# Patient Record
Sex: FEMALE | Race: BLACK OR AFRICAN AMERICAN | NOT HISPANIC OR LATINO | URBAN - METROPOLITAN AREA
[De-identification: names, ages, dates, MRNs, and addresses within clinical notes are randomized per-mention and may not be internally consistent; named-entity substitution may affect disease eponyms.]

---

## 2018-07-09 PROBLEM — Z00.00 ENCOUNTER FOR PREVENTIVE HEALTH EXAMINATION: Status: ACTIVE | Noted: 2018-07-09

## 2021-07-13 ENCOUNTER — INPATIENT (INPATIENT)
Facility: HOSPITAL | Age: 31
LOS: 1 days | Discharge: HOME | End: 2021-07-15
Attending: OBSTETRICS & GYNECOLOGY | Admitting: OBSTETRICS & GYNECOLOGY
Payer: MEDICAID

## 2021-07-13 VITALS — HEART RATE: 76 BPM | TEMPERATURE: 99 F | SYSTOLIC BLOOD PRESSURE: 105 MMHG | DIASTOLIC BLOOD PRESSURE: 63 MMHG

## 2021-07-13 DIAGNOSIS — Z3A.00 WEEKS OF GESTATION OF PREGNANCY NOT SPECIFIED: ICD-10-CM

## 2021-07-13 DIAGNOSIS — Z98.890 OTHER SPECIFIED POSTPROCEDURAL STATES: Chronic | ICD-10-CM

## 2021-07-13 DIAGNOSIS — Z34.80 ENCOUNTER FOR SUPERVISION OF OTHER NORMAL PREGNANCY, UNSPECIFIED TRIMESTER: ICD-10-CM

## 2021-07-13 DIAGNOSIS — O26.899 OTHER SPECIFIED PREGNANCY RELATED CONDITIONS, UNSPECIFIED TRIMESTER: ICD-10-CM

## 2021-07-13 LAB
BASOPHILS # BLD AUTO: 0.03 K/UL — SIGNIFICANT CHANGE UP (ref 0–0.2)
BASOPHILS NFR BLD AUTO: 0.3 % — SIGNIFICANT CHANGE UP (ref 0–2)
BLD GP AB SCN SERPL QL: NEGATIVE — SIGNIFICANT CHANGE UP
EOSINOPHIL # BLD AUTO: 0.04 K/UL — SIGNIFICANT CHANGE UP (ref 0–0.5)
EOSINOPHIL NFR BLD AUTO: 0.4 % — SIGNIFICANT CHANGE UP (ref 0–6)
HCT VFR BLD CALC: 35.7 % — SIGNIFICANT CHANGE UP (ref 34.5–45)
HGB BLD-MCNC: 11.4 G/DL — LOW (ref 11.5–15.5)
IMM GRANULOCYTES NFR BLD AUTO: 0.5 % — SIGNIFICANT CHANGE UP (ref 0–1.5)
LYMPHOCYTES # BLD AUTO: 2.01 K/UL — SIGNIFICANT CHANGE UP (ref 1–3.3)
LYMPHOCYTES # BLD AUTO: 22.1 % — SIGNIFICANT CHANGE UP (ref 13–44)
MCHC RBC-ENTMCNC: 28.6 PG — SIGNIFICANT CHANGE UP (ref 27–34)
MCHC RBC-ENTMCNC: 31.9 GM/DL — LOW (ref 32–36)
MCV RBC AUTO: 89.7 FL — SIGNIFICANT CHANGE UP (ref 80–100)
MONOCYTES # BLD AUTO: 0.57 K/UL — SIGNIFICANT CHANGE UP (ref 0–0.9)
MONOCYTES NFR BLD AUTO: 6.3 % — SIGNIFICANT CHANGE UP (ref 2–14)
NEUTROPHILS # BLD AUTO: 6.4 K/UL — SIGNIFICANT CHANGE UP (ref 1.8–7.4)
NEUTROPHILS NFR BLD AUTO: 70.4 % — SIGNIFICANT CHANGE UP (ref 43–77)
NRBC # BLD: 0 /100 WBCS — SIGNIFICANT CHANGE UP (ref 0–0)
PLATELET # BLD AUTO: 213 K/UL — SIGNIFICANT CHANGE UP (ref 150–400)
RBC # BLD: 3.98 M/UL — SIGNIFICANT CHANGE UP (ref 3.8–5.2)
RBC # FLD: 14.8 % — HIGH (ref 10.3–14.5)
RH IG SCN BLD-IMP: POSITIVE — SIGNIFICANT CHANGE UP
RH IG SCN BLD-IMP: POSITIVE — SIGNIFICANT CHANGE UP
SARS-COV-2 RNA SPEC QL NAA+PROBE: SIGNIFICANT CHANGE UP
WBC # BLD: 9.1 K/UL — SIGNIFICANT CHANGE UP (ref 3.8–10.5)
WBC # FLD AUTO: 9.1 K/UL — SIGNIFICANT CHANGE UP (ref 3.8–10.5)

## 2021-07-13 PROCEDURE — 59409 OBSTETRICAL CARE: CPT | Mod: U9

## 2021-07-13 RX ORDER — BENZOCAINE 10 %
1 GEL (GRAM) MUCOUS MEMBRANE EVERY 6 HOURS
Refills: 0 | Status: DISCONTINUED | OUTPATIENT
Start: 2021-07-13 | End: 2021-07-15

## 2021-07-13 RX ORDER — OXYCODONE HYDROCHLORIDE 5 MG/1
5 TABLET ORAL
Refills: 0 | Status: DISCONTINUED | OUTPATIENT
Start: 2021-07-13 | End: 2021-07-15

## 2021-07-13 RX ORDER — CITRIC ACID/SODIUM CITRATE 300-500 MG
15 SOLUTION, ORAL ORAL EVERY 6 HOURS
Refills: 0 | Status: DISCONTINUED | OUTPATIENT
Start: 2021-07-13 | End: 2021-07-13

## 2021-07-13 RX ORDER — SIMETHICONE 80 MG/1
80 TABLET, CHEWABLE ORAL EVERY 4 HOURS
Refills: 0 | Status: DISCONTINUED | OUTPATIENT
Start: 2021-07-13 | End: 2021-07-15

## 2021-07-13 RX ORDER — DIPHENHYDRAMINE HCL 50 MG
25 CAPSULE ORAL EVERY 6 HOURS
Refills: 0 | Status: DISCONTINUED | OUTPATIENT
Start: 2021-07-13 | End: 2021-07-15

## 2021-07-13 RX ORDER — TETANUS TOXOID, REDUCED DIPHTHERIA TOXOID AND ACELLULAR PERTUSSIS VACCINE, ADSORBED 5; 2.5; 8; 8; 2.5 [IU]/.5ML; [IU]/.5ML; UG/.5ML; UG/.5ML; UG/.5ML
0.5 SUSPENSION INTRAMUSCULAR ONCE
Refills: 0 | Status: DISCONTINUED | OUTPATIENT
Start: 2021-07-13 | End: 2021-07-15

## 2021-07-13 RX ORDER — DIBUCAINE 1 %
1 OINTMENT (GRAM) RECTAL EVERY 6 HOURS
Refills: 0 | Status: DISCONTINUED | OUTPATIENT
Start: 2021-07-13 | End: 2021-07-15

## 2021-07-13 RX ORDER — MAGNESIUM HYDROXIDE 400 MG/1
30 TABLET, CHEWABLE ORAL
Refills: 0 | Status: DISCONTINUED | OUTPATIENT
Start: 2021-07-13 | End: 2021-07-15

## 2021-07-13 RX ORDER — AER TRAVELER 0.5 G/1
1 SOLUTION RECTAL; TOPICAL EVERY 4 HOURS
Refills: 0 | Status: DISCONTINUED | OUTPATIENT
Start: 2021-07-13 | End: 2021-07-15

## 2021-07-13 RX ORDER — OXYTOCIN 10 UNIT/ML
10 VIAL (ML) INJECTION ONCE
Refills: 0 | Status: DISCONTINUED | OUTPATIENT
Start: 2021-07-13 | End: 2021-07-15

## 2021-07-13 RX ORDER — ACETAMINOPHEN 500 MG
975 TABLET ORAL
Refills: 0 | Status: DISCONTINUED | OUTPATIENT
Start: 2021-07-13 | End: 2021-07-15

## 2021-07-13 RX ORDER — SODIUM CHLORIDE 9 MG/ML
1000 INJECTION, SOLUTION INTRAVENOUS
Refills: 0 | Status: DISCONTINUED | OUTPATIENT
Start: 2021-07-13 | End: 2021-07-13

## 2021-07-13 RX ORDER — IBUPROFEN 200 MG
600 TABLET ORAL EVERY 6 HOURS
Refills: 0 | Status: COMPLETED | OUTPATIENT
Start: 2021-07-13 | End: 2022-06-11

## 2021-07-13 RX ORDER — LANOLIN
1 OINTMENT (GRAM) TOPICAL EVERY 6 HOURS
Refills: 0 | Status: DISCONTINUED | OUTPATIENT
Start: 2021-07-13 | End: 2021-07-15

## 2021-07-13 RX ORDER — OXYTOCIN 10 UNIT/ML
333.33 VIAL (ML) INJECTION
Qty: 20 | Refills: 0 | Status: DISCONTINUED | OUTPATIENT
Start: 2021-07-13 | End: 2021-07-13

## 2021-07-13 RX ORDER — KETOROLAC TROMETHAMINE 30 MG/ML
30 SYRINGE (ML) INJECTION ONCE
Refills: 0 | Status: DISCONTINUED | OUTPATIENT
Start: 2021-07-13 | End: 2021-07-13

## 2021-07-13 RX ORDER — OXYTOCIN 10 UNIT/ML
333.33 VIAL (ML) INJECTION
Qty: 20 | Refills: 0 | Status: DISCONTINUED | OUTPATIENT
Start: 2021-07-13 | End: 2021-07-15

## 2021-07-13 RX ORDER — OXYCODONE HYDROCHLORIDE 5 MG/1
5 TABLET ORAL ONCE
Refills: 0 | Status: DISCONTINUED | OUTPATIENT
Start: 2021-07-13 | End: 2021-07-15

## 2021-07-13 RX ORDER — HYDROCORTISONE 1 %
1 OINTMENT (GRAM) TOPICAL EVERY 6 HOURS
Refills: 0 | Status: DISCONTINUED | OUTPATIENT
Start: 2021-07-13 | End: 2021-07-15

## 2021-07-13 RX ORDER — IBUPROFEN 200 MG
600 TABLET ORAL EVERY 6 HOURS
Refills: 0 | Status: DISCONTINUED | OUTPATIENT
Start: 2021-07-13 | End: 2021-07-15

## 2021-07-13 RX ORDER — SODIUM CHLORIDE 9 MG/ML
3 INJECTION INTRAMUSCULAR; INTRAVENOUS; SUBCUTANEOUS EVERY 8 HOURS
Refills: 0 | Status: DISCONTINUED | OUTPATIENT
Start: 2021-07-13 | End: 2021-07-15

## 2021-07-13 RX ORDER — PRAMOXINE HYDROCHLORIDE 150 MG/15G
1 AEROSOL, FOAM RECTAL EVERY 4 HOURS
Refills: 0 | Status: DISCONTINUED | OUTPATIENT
Start: 2021-07-13 | End: 2021-07-15

## 2021-07-13 RX ADMIN — Medication 30 MILLIGRAM(S): at 21:18

## 2021-07-13 RX ADMIN — Medication 1000 MILLIUNIT(S)/MIN: at 21:46

## 2021-07-13 NOTE — OB PROVIDER DELIVERY SUMMARY - NSCOUNTCORRECT_OBGYN_ALL_OB
"-- Message is from the [employer]--    Reason for Call: Patient did her blood test at [facility] today 2/28/2019 and they inform her that her blood sugar was 452. Patient was instructed to communicate with her PCP for further instructions of the matter. Patient unfortunately was not able to take her Novolog Medication for two week due to financial issues. However, patient will go to her pharmacy tomorrow 3/1/2019. Please communicate with patient as soon as possible. Caller Information       Type Contact Phone    02/28/2019 11:57 AM Phone (Incoming) Tierra Dixon (Self) [phone] (H)          Alternative phone number: N/A    Turnaround time given to caller: ""This message will be sent to [facility] Provider's name]. The clinical team will fulfill your request as soon as they review your message. \""    " Laps, needles and instruments count was reported as correct.

## 2021-07-13 NOTE — OB NEONATOLOGY/PEDIATRICIAN DELIVERY SUMMARY - NSPEDSNEONOTESA_OBGYN_ALL_OB_FT
Pediatrician called to delivery for meconium stained fluids during delivery. Male infant born at 40.2 wks via  to a 32 y/o  blood type O+ mother. Maternal history of anemia. No significant prenatal history reported. Prenatal labs nr/immune/-, GBS - on . SROM at 00:00 on 21 with clear fluids. Baby emerged vigorous, crying. Infant was brought to radiant warmer and warmed, dried, stimulated and suctioned. HR>100, normal respiratory effort. APGARS of 8/9. Mom is initiating breast feeding. Consents to Hepatitis B vaccination. Desires for infant to be circumcised. EOS score 0.23 (ROM 21h, Tm 37.1C, no Abx). Infant admitted to well-baby nursery.

## 2021-07-13 NOTE — OB RN PATIENT PROFILE - NS_OBGYNHISTORY_OBGYN_ALL_OB_FT
misc x1, TOP x3, D&C x3,  x2 (, 2018)  Heart palpitations with pregnancy, denies chest pain/dizzziness

## 2021-07-13 NOTE — OB PROVIDER H&P - HISTORY OF PRESENT ILLNESS
Patient is a 30 y/o  @40w3d (CHRISTIAN: 7/10/2021) who presents from OB, Dr. Clair Barraza, for r/o ROM.  Patient states that she awoke last night around 12:05 AM (21) with a small leakage of clear fluid.  Around 4:00 AM (21) she experienced another leakage of fluid.  Patient denies any large gush of fluid or continuous leakage.  -vaginal bleeding. + ctx q4-5 minutes.  +FM. No complications during this pregnancy. OB: Dr. Clair Gonsalez; last saw today in the office, no pelvic exam performed.  Last US was @36 weeks and normal.     GBS negative  EFW 4200 grams    OBHx:   G1:  EAB @ 17w   G2:  SAB 12w  G3:   36w, 6lb  G4:  EAB   G5:  VAVD GFT 8lb 6oz   G6:  EAB     GYNHx: No ovarian cysts, fibroids, hx of abnormal pap or STDs  PMHx: +anemia. No hx of DM, HTN, asthma, bleeding or clotting disorders or blood transfusions.  PSurgHx: D&C after 3 termination of pregnancies  Allergies: NKDA  Meds: PNV, iron  Social: No smoking, alcohol, or illicit drug use during pregnancy   Psych: No hx of anxiety or depression

## 2021-07-13 NOTE — OB PROVIDER LABOR PROGRESS NOTE - NS_SUBJECTIVE/OBJECTIVE_OBGYN_ALL_OB_FT
Patient turned over by day team. Patient admitted for suspected rupture of membranes and currently has epidural in place.   Past medical and surgical history reviewed. Denies medication allergies.  Patient reports feeling pelvic pressure.  EFW 4200g

## 2021-07-13 NOTE — OB PROVIDER DELIVERY SUMMARY - NSSELHIDDEN_OBGYN_ALL_OB_FT
[NS_DeliveryAttending1_OBGYN_ALL_OB_FT:[email or web address]=],[NS_DeliveryAssist1_OBGYN_ALL_OB_FT:[id number]=]

## 2021-07-13 NOTE — OB PROVIDER DELIVERY SUMMARY - NSPROVIDERDELIVERYNOTE_OBGYN_ALL_OB_FT
Patient with urge to push. Fetal head descended with each push. Head delivered in OA presentation and restituted to GURU. Anterior and posterior shoulders delivered without difficulty on next contraction. Fetal body delivered shortly after. Cord gases and blood collected.   Placenta dli Patient with urge to push. Fetal head descended with each push. Head delivered in OA presentation and restituted to GURU. Anterior and posterior shoulders delivered without difficulty on next contraction. Fetal body delivered shortly after. Cord gases and blood collected.   Placenta delivered intact and trailing membranes reduced. Bimanual massage performed and Pitocin 10u IM x 1 dose given. Sulci and cervix visualized without lacerations. 1st degree perineal laceration noted and repaired after infiltration of Lidocaine and reapproximation of 2-0 Vicryl suture.   Fundus noted to be firm.   Patient tolerated delivery well.   EBL 350cc

## 2021-07-13 NOTE — OB PROVIDER H&P - ATTENDING COMMENTS
32yo P2 @ 40+ weeks admitted in labor w likely SROM at home  see [facility]  pt reports uncomplicated pregnancy course  (note VAVD in 2018)  GBS unknown, per pt    ICU Vital Signs Last 24 Hrs  T(C): 37.1 (2021 17:03), Max: 37.1 (2021 14:19)  T(F): 98.78 (2021 17:03), Max: 98.8 (2021 14:27)  HR: 85 (2021 17:27) (70 - 95)  BP: 121/72 (2021 17:02) (105/63 - 121/72)  RR: 18 (2021 14:27) (18 - 18)  SpO2: 100% (2021 17:27) (97% - 100%)    FHR: baseline indeterminate, moderate variability  VE: 5cm  (reported)  TOCO:  Q 2-4 min      Multip in labor  ??SROM  observe FHR for baseline and fetal   no indication for abx per protocal--consider if maternal temp or > 18 hour ROM  augment if indicated    Discussed earlier--about labor and delivery process, meds,  and  operative delivery    FOSTER Blair MD  attending

## 2021-07-13 NOTE — OB RN DELIVERY SUMMARY - NS_SEPSISRSKCALC_OBGYN_ALL_OB_FT
GBS status in the 'Prenatal Lab tests/results section' on the OB RN Patient Profile must be documented.   EOS calculated successfully. EOS Risk Factor: 0.5/1000 live births ([facility] national incidence); GA=40w3d; Temp=98.8; ROM=20.483; GBS='Negative'; Antibiotics='No antibiotics or any antibiotics < 2 hrs prior to birth'

## 2021-07-13 NOTE — OB PROVIDER TRIAGE NOTE - HISTORY OF PRESENT ILLNESS
Patient is a 32 y/o  @40w3d (CHRISTIAN: 7/10/2021) who presents from OB, Dr. Clair Barraza, for r/o ROM.  Patient states that she awoke last night around 12:05 AM (21) with a small leakage of clear fluid.  Around 4:00 AM (21) she experienced another leakage of fluid.  Patient denies any large gush of fluid or continuous leakage.  -vaginal bleeding. + ctx q4-5 minutes.  +FM. No complications during this pregnancy. OB: Dr. Clair Gonsalez; last saw today in the office, no pelvic exam performed.  Last US was @36 weeks and normal.     GBS negative  EFW 4200 grams    OBHx: 2013  @ 36w6d, 6 lb.  2018 vaccuum assisted VD at term, 8 lb 6 oz.  Miscarriage  @12 weeks.  3 EAB;  @17 weeks,  @10 weeks,  @10 weeks (patient believes that she had D&C after these abortions, but is not certain).   GYNHx: No ovarian cysts, fibroids, hx of abnormal pap or STDs  PMHx: +anemia. No hx of DM, HTN, asthma, bleeding or clotting disorders or blood transfusions.  PSurgHx: D&C after 3 termination of pregnancies  Allergies: NKDA  Meds: PNV, iron  Social: No smoking, alcohol, or illicit drug use during pregnancy   Psych: No hx of anxiety or depression Patient is a 30 y/o  @40w3d (CHRISTIAN: 7/10/2021) who presents from OB, Dr. Clair Barraza, for r/o ROM.  Patient states that she awoke last night around 12:05 AM (21) with a small leakage of clear fluid.  Around 4:00 AM (21) she experienced another leakage of fluid.  Patient denies any large gush of fluid or continuous leakage.  -vaginal bleeding. + ctx q4-5 minutes.  +FM. No complications during this pregnancy. OB: Dr. Clair Gonsalez; last saw today in the office, no pelvic exam performed.  Last US was @36 weeks and normal.     GBS negative  EFW 4200 grams    OBHx:   G1:  EAB @ 17w   G2:  SAB 12w  G3:   36w, 6lb  G4:  EAB   G5:  VAVD GFT 8lb 6oz   G6:  EAB     GYNHx: No ovarian cysts, fibroids, hx of abnormal pap or STDs  PMHx: +anemia. No hx of DM, HTN, asthma, bleeding or clotting disorders or blood transfusions.  PSurgHx: D&C after 3 termination of pregnancies  Allergies: NKDA  Meds: PNV, iron  Social: No smoking, alcohol, or illicit drug use during pregnancy   Psych: No hx of anxiety or depression

## 2021-07-13 NOTE — OB PROVIDER TRIAGE NOTE - NSOBPROVIDERNOTE_OBGYN_ALL_OB_FT
A/P    Patient is a 30 y/o  @40w3d (CHRISTIAN: 7/10/2021) who presents from OB, Dr. Clair Barraza, for r/o ROM.  On exam, no pooling, nitrazine negative and no ferning seen.     - Admit to L&D  - Routine labs   - COVID swab for PCR  - EFM/TOCO  - Clear liquid diet  - IVF  - Anesthesia consult   - Bicitra  - Expect     Case discussed with *** A/P    Patient is a 32 y/o  @40w3d (CHRISTIAN: 7/10/2021) who presents from OB, Dr. Clair Barraza, for r/o ROM.  On exam, no pooling, nitrazine negative and no ferning seen.     - Admit to L&D  - Routine labs   - COVID swab for PCR  - EFM/TOCO  - Clear liquid diet  - IVF  - Anesthesia consult   - Bicitra  - Expect     SOPHIE Galo, PGY-1  D/w Dr. Blair

## 2021-07-13 NOTE — OB RN DELIVERY SUMMARY - NSSELHIDDEN_OBGYN_ALL_OB_FT
[NS_DeliveryAttending1_OBGYN_ALL_OB_FT:[email or web address]=],[NS_DeliveryRN_OBGYN_ALL_OB_FT:[id number]]

## 2021-07-13 NOTE — OB PROVIDER LABOR PROGRESS NOTE - ASSESSMENT
Active Labor   s/p AROM forebag + mec   -continue EFM and toco   -discussed shoulder dystocia, possible fetal injury, nerve damage or fetal death due to size of baby, patient expressed understanding   -anticipate NOLAN Ayala

## 2021-07-13 NOTE — OB RN TRIAGE NOTE - PMH
Chlamydia infection  treated  COVID-19  2021  Miscarriage     (normal spontaneous vaginal delivery)  x2

## 2021-07-14 LAB
COVID-19 SPIKE DOMAIN AB INTERP: POSITIVE
COVID-19 SPIKE DOMAIN ANTIBODY RESULT: 123 U/ML — HIGH
HBV SURFACE AG SERPL QL IA: SIGNIFICANT CHANGE UP
HIV 1+2 AB+HIV1 P24 AG SERPL QL IA: SIGNIFICANT CHANGE UP
SARS-COV-2 IGG+IGM SERPL QL IA: 123 U/ML — HIGH
SARS-COV-2 IGG+IGM SERPL QL IA: POSITIVE
T PALLIDUM AB TITR SER: NEGATIVE — SIGNIFICANT CHANGE UP

## 2021-07-14 RX ADMIN — Medication 600 MILLIGRAM(S): at 08:49

## 2021-07-14 RX ADMIN — Medication 600 MILLIGRAM(S): at 09:40

## 2021-07-14 RX ADMIN — Medication 600 MILLIGRAM(S): at 22:30

## 2021-07-14 RX ADMIN — Medication 975 MILLIGRAM(S): at 18:02

## 2021-07-14 RX ADMIN — Medication 600 MILLIGRAM(S): at 14:41

## 2021-07-14 RX ADMIN — Medication 975 MILLIGRAM(S): at 23:58

## 2021-07-14 RX ADMIN — Medication 975 MILLIGRAM(S): at 13:05

## 2021-07-14 RX ADMIN — Medication 975 MILLIGRAM(S): at 06:01

## 2021-07-14 RX ADMIN — Medication 600 MILLIGRAM(S): at 15:40

## 2021-07-14 RX ADMIN — Medication 1 TABLET(S): at 12:11

## 2021-07-14 RX ADMIN — Medication 975 MILLIGRAM(S): at 06:43

## 2021-07-14 RX ADMIN — Medication 600 MILLIGRAM(S): at 21:48

## 2021-07-14 RX ADMIN — SODIUM CHLORIDE 3 MILLILITER(S): 9 INJECTION INTRAMUSCULAR; INTRAVENOUS; SUBCUTANEOUS at 00:53

## 2021-07-14 RX ADMIN — Medication 975 MILLIGRAM(S): at 12:11

## 2021-07-15 ENCOUNTER — TRANSCRIPTION ENCOUNTER (OUTPATIENT)
Age: 31
End: 2021-07-15

## 2021-07-15 VITALS
RESPIRATION RATE: 18 BRPM | HEART RATE: 62 BPM | DIASTOLIC BLOOD PRESSURE: 69 MMHG | TEMPERATURE: 98 F | OXYGEN SATURATION: 98 % | SYSTOLIC BLOOD PRESSURE: 112 MMHG

## 2021-07-15 PROCEDURE — 86780 TREPONEMA PALLIDUM: CPT

## 2021-07-15 PROCEDURE — 87635 SARS-COV-2 COVID-19 AMP PRB: CPT

## 2021-07-15 PROCEDURE — 87340 HEPATITIS B SURFACE AG IA: CPT

## 2021-07-15 PROCEDURE — 86762 RUBELLA ANTIBODY: CPT

## 2021-07-15 PROCEDURE — G0463: CPT

## 2021-07-15 PROCEDURE — 59025 FETAL NON-STRESS TEST: CPT

## 2021-07-15 PROCEDURE — 86850 RBC ANTIBODY SCREEN: CPT

## 2021-07-15 PROCEDURE — 59050 FETAL MONITOR W/REPORT: CPT

## 2021-07-15 PROCEDURE — 86769 SARS-COV-2 COVID-19 ANTIBODY: CPT

## 2021-07-15 PROCEDURE — 86900 BLOOD TYPING SEROLOGIC ABO: CPT

## 2021-07-15 PROCEDURE — 85025 COMPLETE CBC W/AUTO DIFF WBC: CPT

## 2021-07-15 PROCEDURE — 87389 HIV-1 AG W/HIV-1&-2 AB AG IA: CPT

## 2021-07-15 PROCEDURE — 86901 BLOOD TYPING SEROLOGIC RH(D): CPT

## 2021-07-15 RX ORDER — IBUPROFEN 200 MG
1 TABLET ORAL
Qty: 0 | Refills: 0 | DISCHARGE
Start: 2021-07-15

## 2021-07-15 RX ORDER — ACETAMINOPHEN 500 MG
3 TABLET ORAL
Qty: 0 | Refills: 0 | DISCHARGE
Start: 2021-07-15

## 2021-07-15 RX ADMIN — Medication 600 MILLIGRAM(S): at 03:55

## 2021-07-15 RX ADMIN — Medication 975 MILLIGRAM(S): at 00:40

## 2021-07-15 RX ADMIN — Medication 600 MILLIGRAM(S): at 09:14

## 2021-07-15 RX ADMIN — Medication 600 MILLIGRAM(S): at 10:00

## 2021-07-15 RX ADMIN — Medication 600 MILLIGRAM(S): at 03:11

## 2021-07-15 NOTE — DISCHARGE NOTE OB - INSTRUCTIONS
Make your follow-up appointment with your doctor in **(3 days for a blood pressure check and in)** 6 weeks for a post-partum check. No heavy lifting, driving, or strenuous activity for 6 weeks. Nothing per vagina such as tampons, intercourse, douches, or tub baths for 6 weeks or until you see your doctor. Call your doctor with any signs and symptoms of infection such as fever, chills, nausea, or vomiting. Call your doctor if you're unable to tolerate food, increase in vaginal bleeding, or have difficulty urinating. Call your doctor if you have pain that is not relieved by your prescribed medications. Notify your doctor with any other concerns.   Call [phone] if you have any of these concerns in the next 6 weeks. see admission packet, 4 [phone] [phone]

## 2021-07-15 NOTE — PROGRESS NOTE ADULT - SUBJECTIVE AND OBJECTIVE BOX
OB Progress Note:  PPD#2    S: 30yo  PPD#2 s/p . Patient feels well. Pain is well controlled. She is tolerating a regular diet and passing flatus. She is voiding spontaneously, and ambulating without difficulty. Denies CP/SOB. Denies HA/lightheadedness/dizziness. Denies N/V. Denies calf pain    O:  Vitals:   Vital Signs Last 24 Hrs  T(C): 36.7 (15 Jul 2021 05:08), Max: 36.7 (2021 17:52)  T(F): 98 (15 Jul 2021 05:08), Max: 98.1 (2021 17:52)  HR: 62 (15 Jul 2021 05:08) (62 - 69)  BP: 112/69 (15 Jul 2021 05:08) (97/63 - 112/69)  BP(mean): --  RR: 18 (15 Jul 2021 05:08) (18 - 18)  SpO2: 98% (15 Jul 2021 05:08) (98% - 99%)    MEDICATIONS  (STANDING):  acetaminophen   Tablet .. 975 milliGRAM(s) Oral <User Schedule>  diphtheria/tetanus/pertussis (acellular) Vaccine (ADAcel) 0.5 milliLiter(s) IntraMuscular once  ibuprofen  Tablet. 600 milliGRAM(s) Oral every 6 hours  oxytocin Infusion 333.333 milliUNIT(s)/Min (1000 mL/Hr) IV Continuous <Continuous>  oxytocin Injectable 10 Unit(s) IntraMuscular once  oxytocin Injectable 10 Unit(s) IntraMuscular once  prenatal multivitamin 1 Tablet(s) Oral daily  sodium chloride 0.9% lock flush 3 milliLiter(s) IV Push every 8 hours    MEDICATIONS  (PRN):  benzocaine 20%/menthol 0.5% Spray 1 Spray(s) Topical every 6 hours PRN for Perineal discomfort  dibucaine 1% Ointment 1 Application(s) Topical every 6 hours PRN Perineal discomfort  diphenhydrAMINE 25 milliGRAM(s) Oral every 6 hours PRN Pruritus  hydrocortisone 1% Cream 1 Application(s) Topical every 6 hours PRN Moderate Pain (4-6)  lanolin Ointment 1 Application(s) Topical every 6 hours PRN nipple soreness  magnesium hydroxide Suspension 30 milliLiter(s) Oral two times a day PRN Constipation  oxyCODONE    IR 5 milliGRAM(s) Oral every 3 hours PRN Moderate to Severe Pain (4-10)  oxyCODONE    IR 5 milliGRAM(s) Oral once PRN Moderate to Severe Pain (4-10)  pramoxine 1%/zinc 5% Cream 1 Application(s) Topical every 4 hours PRN Moderate Pain (4-6)  simethicone 80 milliGRAM(s) Chew every 4 hours PRN Gas  witch hazel Pads 1 Application(s) Topical every 4 hours PRN Perineal discomfort      Labs:  Blood type: O Positive  Rubella IgG: RPR: Negative                          11.4<L>   9.10 >-----------< 213    (  @ 17:30 )             35.7                  Physical Exam:  General: NAD  Abdomen: soft, non-tender, non-distended, fundus firm  Vaginal: lochia wnl, exam deferred  Extremities: No erythema/calf tenderness    
OB Progress Note:  PPD#1    S: 32yo PPD#1 s/p . Patient feels well. Pain is well controlled. She is tolerating a regular diet and passing flatus. She is voiding spontaneously, and ambulating without difficulty. Denies CP/SOB. Denies HA/lightheadedness/dizziness. Denies N/V. Denies calf pain.    O:  Vitals:  Vital Signs Last 24 Hrs  T(C): 36.6 (2021 06:03), Max: 37.1 (2021 14:19)  T(F): 97.9 (2021 06:03), Max: 98.8 (2021 14:27)  HR: 84 (2021 06:03) (70 - 125)  BP: 104/71 (2021 06:03) (104/71 - 143/56)  BP(mean): --  RR: 18 (2021 06:03) (16 - 19)  SpO2: 97% (2021 06:03) (84% - 100%)    MEDICATIONS  (STANDING):  acetaminophen   Tablet .. 975 milliGRAM(s) Oral <User Schedule>  diphtheria/tetanus/pertussis (acellular) Vaccine (ADAcel) 0.5 milliLiter(s) IntraMuscular once  ibuprofen  Tablet. 600 milliGRAM(s) Oral every 6 hours  oxytocin Infusion 333.333 milliUNIT(s)/Min (1000 mL/Hr) IV Continuous <Continuous>  oxytocin Injectable 10 Unit(s) IntraMuscular once  oxytocin Injectable 10 Unit(s) IntraMuscular once  prenatal multivitamin 1 Tablet(s) Oral daily  sodium chloride 0.9% lock flush 3 milliLiter(s) IV Push every 8 hours      Labs:  Blood type: O Positive  Rubella IgG: RPR: Negative                          11.4<L>   9.10 >-----------< 213    ( 13 @ 17:30 )             35.7                  Physical Exam:  General: NAD  Abdomen: soft, non-tender, non-distended, fundus firm  Vaginal: lochia wnl, exam deferred  Extremities: no erythema/calf tenderness

## 2021-07-15 NOTE — PROGRESS NOTE ADULT - ASSESSMENT
A/P: 32yo PPD#1 s/p .  Patient is stable and doing well post-partum.   - Pain well controlled, continue current pain regimen  - Increase ambulation, SCDs when not ambulating  - Continue regular diet    SOPHIE Galo PGY-1
A/P: 32yo PPD#2 s/p .  Patient is stable and doing well post-partum.    - Pain well controlled, continue current pain regimen  - Increase ambulation, SCDs when not ambulating  - Continue regular diet  - Discharge planning     SOPHIE Galo PGY-1

## 2021-07-15 NOTE — DISCHARGE NOTE OB - PATIENT PORTAL LINK FT
You can access the FollowMyHealth Patient Portal offered by [facility] by registering at the following website: [email or web address]. By joining [facility]’s FollowMyHealth portal, you will also be able to view your health information using other applications (apps) compatible with our system.

## 2021-07-15 NOTE — DISCHARGE NOTE OB - HOSPITAL COURSE
Patient had an uncomplicated  followed by an uncomplicated postpartum course. Patient was discharged home in stable condition, voiding spontaneously and with normal vital signs.

## 2021-07-15 NOTE — DISCHARGE NOTE OB - CARE PLAN
Principal Discharge DX:	 (normal spontaneous vaginal delivery)  Goal:	Routine recovery  Assessment and plan of treatment:	routine recovery

## 2021-07-15 NOTE — DISCHARGE NOTE OB - MATERIALS PROVIDED
Vaccinations/[facility]  Screening Program/  Immunization Record/Breastfeeding Log/Breastfeeding Mother’s Support Group Information/Guide to Postpartum Care/[facility] Hearing Screen Program/Back To Sleep Handout/Shaken Baby Prevention Handout/Breastfeeding Guide and Packet/Birth Certificate Instructions

## 2021-07-15 NOTE — PROGRESS NOTE ADULT - ATTENDING COMMENTS
Patient seen and examined.    Patient is stable for discharge.    When to call MD discussed.  Follow-up is arranged.

## 2021-07-17 LAB
RUBV IGG SER-ACNC: 6.8 INDEX — SIGNIFICANT CHANGE UP
RUBV IGG SER-IMP: POSITIVE — SIGNIFICANT CHANGE UP

## 2023-04-17 NOTE — OB PROVIDER TRIAGE NOTE - NSOBTRIAGEATTEST_OBGYN_ALL_OB
Pt c/o Lt flank pain    My signature indicates that I have reviewed the triage evaluation as was entered by nursing and a medical provider and I agree with the evaluation.

## 2023-05-08 NOTE — OB PROVIDER TRIAGE NOTE - NSHPPHYSICALEXAM_GEN_ALL_CORE
Attempted to call patient to check her in for her VV O apt however she did not answer and her VM was full.  Patient will need to be called to collect copay of $10
PE:  T(C): 37.1 (07-13-21 @ 14:27), Max: 37.1 (07-13-21 @ 14:19)  HR: 78 (07-13-21 @ 15:17) (75 - 92)  BP: 105/63 (07-13-21 @ 14:27) (105/63 - 105/63)  RR: 18 (07-13-21 @ 14:27) (18 - 18)  SpO2: 100% (07-13-21 @ 15:17) (100% - 100%)  General: NAD, A&Ox3  CV: RRR  Lungs: Clear bilat   Abd: soft, nontender, gravid  VE:  EFM: /moderate variablity/+accels/-decels  TOCO: q4-5 mins